# Patient Record
Sex: FEMALE | Race: WHITE | Employment: FULL TIME | URBAN - METROPOLITAN AREA
[De-identification: names, ages, dates, MRNs, and addresses within clinical notes are randomized per-mention and may not be internally consistent; named-entity substitution may affect disease eponyms.]

---

## 2017-03-16 ENCOUNTER — OFFICE VISIT (OUTPATIENT)
Dept: ENDOCRINOLOGY | Age: 32
End: 2017-03-16

## 2017-03-16 VITALS
BODY MASS INDEX: 28.74 KG/M2 | DIASTOLIC BLOOD PRESSURE: 81 MMHG | SYSTOLIC BLOOD PRESSURE: 131 MMHG | RESPIRATION RATE: 16 BRPM | WEIGHT: 194.6 LBS | OXYGEN SATURATION: 98 % | HEART RATE: 107 BPM

## 2017-03-16 DIAGNOSIS — R53.82 CHRONIC FATIGUE: ICD-10-CM

## 2017-03-16 DIAGNOSIS — E28.2 PCOS (POLYCYSTIC OVARIAN SYNDROME): Primary | ICD-10-CM

## 2017-03-16 DIAGNOSIS — E06.3 HASHIMOTO'S DISEASE: ICD-10-CM

## 2017-03-16 DIAGNOSIS — R79.89 LOW SERUM CORTISOL LEVEL: ICD-10-CM

## 2017-03-16 DIAGNOSIS — Z15.89 HOMOZYGOUS MTHFR MUTATION C677T: ICD-10-CM

## 2017-03-16 PROCEDURE — 99214 OFFICE O/P EST MOD 30 MIN: CPT | Performed by: INTERNAL MEDICINE

## 2017-03-16 RX ORDER — COSYNTROPIN 0.25 MG/ML
0.25 INJECTION, POWDER, FOR SOLUTION INTRAMUSCULAR; INTRAVENOUS ONCE
Qty: 250 MCG | Refills: 0 | Status: SHIPPED | OUTPATIENT
Start: 2017-03-16 | End: 2017-03-16

## 2017-03-16 RX ORDER — GUAIFENESIN/EPHEDRINE HCL 200-12.5MG
TABLET ORAL
COMMUNITY
End: 2018-05-31

## 2017-03-27 ENCOUNTER — OFFICE VISIT (OUTPATIENT)
Dept: FAMILY MEDICINE CLINIC | Age: 32
End: 2017-03-27

## 2017-03-27 VITALS
SYSTOLIC BLOOD PRESSURE: 100 MMHG | BODY MASS INDEX: 28.2 KG/M2 | DIASTOLIC BLOOD PRESSURE: 70 MMHG | HEIGHT: 70 IN | HEART RATE: 77 BPM | OXYGEN SATURATION: 97 % | WEIGHT: 197 LBS

## 2017-03-27 DIAGNOSIS — E03.9 ACQUIRED HYPOTHYROIDISM: ICD-10-CM

## 2017-03-27 DIAGNOSIS — R79.89 LOW SERUM CORTISOL LEVEL: ICD-10-CM

## 2017-03-27 DIAGNOSIS — F34.1 DYSTHYMIA: Primary | ICD-10-CM

## 2017-03-27 DIAGNOSIS — E28.2 PCOS (POLYCYSTIC OVARIAN SYNDROME): ICD-10-CM

## 2017-03-27 PROCEDURE — 99214 OFFICE O/P EST MOD 30 MIN: CPT | Performed by: FAMILY MEDICINE

## 2017-03-28 ENCOUNTER — PATIENT MESSAGE (OUTPATIENT)
Dept: ENDOCRINOLOGY | Age: 32
End: 2017-03-28

## 2017-03-28 ENCOUNTER — HOSPITAL ENCOUNTER (OUTPATIENT)
Dept: ONCOLOGY | Age: 32
Discharge: HOME | End: 2017-03-31
Attending: INTERNAL MEDICINE | Admitting: INTERNAL MEDICINE

## 2017-03-28 VITALS
RESPIRATION RATE: 18 BRPM | SYSTOLIC BLOOD PRESSURE: 111 MMHG | TEMPERATURE: 98 F | HEART RATE: 67 BPM | DIASTOLIC BLOOD PRESSURE: 76 MMHG

## 2017-03-28 LAB
CORTISOL 30 MIN: 16.9 UG/DL
CORTISOL 60 MIN: 20.9 UG/DL
CORTISOL BASE: 5.8 UG/DL

## 2017-03-28 RX ORDER — COSYNTROPIN 0.25 MG/ML
0.25 INJECTION, POWDER, FOR SOLUTION INTRAMUSCULAR; INTRAVENOUS ONCE
Status: COMPLETED | OUTPATIENT
Start: 2017-03-28 | End: 2017-03-28

## 2017-03-28 RX ADMIN — COSYNTROPIN 0.25 MG: 0.25 INJECTION, POWDER, FOR SOLUTION INTRAMUSCULAR; INTRAVENOUS at 09:49

## 2017-04-02 DIAGNOSIS — R79.89 LOW SERUM CORTISOL LEVEL: Primary | ICD-10-CM

## 2017-04-02 RX ORDER — HYDROCORTISONE 5 MG/1
5 TABLET ORAL 3 TIMES DAILY
Qty: 90 TABLET | Refills: 2 | Status: SHIPPED | OUTPATIENT
Start: 2017-04-02 | End: 2017-04-26 | Stop reason: SDUPTHER

## 2017-04-11 ENCOUNTER — TELEPHONE (OUTPATIENT)
Dept: ENDOCRINOLOGY | Age: 32
End: 2017-04-11

## 2017-04-18 RX ORDER — SYRINGE W-NEEDLE,DISPOSAB,3 ML 25GX5/8"
1 SYRINGE, EMPTY DISPOSABLE MISCELLANEOUS DAILY PRN
Qty: 25 EACH | Refills: 0 | Status: SHIPPED | OUTPATIENT
Start: 2017-04-18 | End: 2018-05-31

## 2017-04-19 ENCOUNTER — TELEPHONE (OUTPATIENT)
Dept: ENDOCRINOLOGY | Age: 32
End: 2017-04-19

## 2017-04-24 ENCOUNTER — TELEPHONE (OUTPATIENT)
Dept: ENDOCRINOLOGY | Age: 32
End: 2017-04-24

## 2017-04-26 RX ORDER — HYDROCORTISONE 10 MG/1
10 TABLET ORAL 3 TIMES DAILY
Qty: 90 TABLET | Refills: 2 | Status: SHIPPED | OUTPATIENT
Start: 2017-04-26 | End: 2018-01-09

## 2017-08-01 RX ORDER — HYDROCORTISONE 5 MG/1
TABLET ORAL
Qty: 90 TABLET | Refills: 0 | Status: SHIPPED | OUTPATIENT
Start: 2017-08-01 | End: 2018-01-09

## 2017-09-28 ENCOUNTER — OFFICE VISIT (OUTPATIENT)
Dept: FAMILY MEDICINE CLINIC | Age: 32
End: 2017-09-28

## 2017-09-28 VITALS
SYSTOLIC BLOOD PRESSURE: 124 MMHG | TEMPERATURE: 98.6 F | BODY MASS INDEX: 29.99 KG/M2 | DIASTOLIC BLOOD PRESSURE: 86 MMHG | WEIGHT: 209 LBS | OXYGEN SATURATION: 99 % | HEART RATE: 98 BPM

## 2017-09-28 DIAGNOSIS — R79.89 LOW SERUM CORTISOL LEVEL: ICD-10-CM

## 2017-09-28 DIAGNOSIS — F41.9 ANXIETY: Primary | ICD-10-CM

## 2017-09-28 DIAGNOSIS — E06.3 HASHIMOTO'S DISEASE: ICD-10-CM

## 2017-09-28 PROCEDURE — 99214 OFFICE O/P EST MOD 30 MIN: CPT | Performed by: FAMILY MEDICINE

## 2017-09-28 RX ORDER — ESCITALOPRAM OXALATE 10 MG/1
TABLET ORAL
Qty: 30 TABLET | Refills: 5 | Status: SHIPPED | OUTPATIENT
Start: 2017-09-28 | End: 2018-05-31

## 2017-12-27 ENCOUNTER — TELEPHONE (OUTPATIENT)
Dept: FAMILY MEDICINE CLINIC | Age: 32
End: 2017-12-27

## 2017-12-28 ENCOUNTER — OFFICE VISIT (OUTPATIENT)
Dept: FAMILY MEDICINE CLINIC | Age: 32
End: 2017-12-28

## 2017-12-28 VITALS
HEIGHT: 70 IN | BODY MASS INDEX: 29.63 KG/M2 | SYSTOLIC BLOOD PRESSURE: 142 MMHG | WEIGHT: 207 LBS | DIASTOLIC BLOOD PRESSURE: 97 MMHG | HEART RATE: 92 BPM

## 2017-12-28 DIAGNOSIS — R03.0 ELEVATED BP WITHOUT DIAGNOSIS OF HYPERTENSION: ICD-10-CM

## 2017-12-28 DIAGNOSIS — R00.2 PALPITATIONS: ICD-10-CM

## 2017-12-28 DIAGNOSIS — R07.9 CHEST PAIN, UNSPECIFIED TYPE: Primary | ICD-10-CM

## 2017-12-28 DIAGNOSIS — R53.82 CHRONIC FATIGUE: ICD-10-CM

## 2017-12-28 PROCEDURE — 99214 OFFICE O/P EST MOD 30 MIN: CPT | Performed by: FAMILY MEDICINE

## 2017-12-28 PROCEDURE — 93000 ELECTROCARDIOGRAM COMPLETE: CPT | Performed by: FAMILY MEDICINE

## 2017-12-28 RX ORDER — LEVOTHYROXINE AND LIOTHYRONINE 19; 4.5 UG/1; UG/1
30 TABLET ORAL DAILY
COMMUNITY
End: 2018-05-31

## 2017-12-28 ASSESSMENT — PATIENT HEALTH QUESTIONNAIRE - PHQ9
2. FEELING DOWN, DEPRESSED OR HOPELESS: 1
1. LITTLE INTEREST OR PLEASURE IN DOING THINGS: 0
SUM OF ALL RESPONSES TO PHQ9 QUESTIONS 1 & 2: 1
SUM OF ALL RESPONSES TO PHQ QUESTIONS 1-9: 1

## 2017-12-28 NOTE — PROGRESS NOTES
Subjective:      Patient ID: Silva Dunn is a 34 y.o. female. HPI  Chief Complaint   Patient presents with    Chest Pain     started in november, constant x 4 wks, elevated BP x 3 wks    Had episodes of and now has constant sternal pain, initially worse after eating at times, could awaken pt from sleep. Pt had been eating 2 meals per day but has now changed to 6 small meals per day and sx's no lonager seem related to eating. Denies sob, n/v, diaphoresis during cp. Activity and movement do not exacerbate sternal pain. Occ feels palpitations, not necessarily related to cp. Denies syncope. Saw endocrinologist at [facility] early Dec 2017, was dx'd with POTS. Endocrine rx'd florinef but had migraines. Was told to decrease  Florinef and HA improved. Was told to wear b/l knee high support socks and to drink electrolyte water. Pt was referred to GI, cardiology and neurologist. Has appt with [facility] cardiology in Mar, unsure when other appts will be scheduled. Pt emailed [facility] endo few days ago b/c bp has been high. Pt was told to d/c florinef and see dr for ekg and echo. Last florinef 12/25/17. Does not feel better since stopping rx. Was off hydrocortisone x 5 wks and then repeated ACTH challenge at [facility]. Test reportedly now c/w Addisons but perhaps mildly abnl. Pt cont's to feel fatigued. LE pain cont'd. Pt had 2016 LE EMG, was nl. Pain is better since wearing support socks. Ears and hands feel hot, hands are sweating. Pt stopped vit D and all supplements x 2 mo. Vit D was 62 in 3/17 but was on vit D 1000 iu qd at that time. Thyr labs nl at [facility], and pt con'ts on armour thyroid. Has lump L prox medial groin noted 1 day ago, not painful but pt is concerned it may need I&D as 1 did in the past.     Review of Systems   Constitutional: Positive for fatigue. Negative for activity change, appetite change, fever and unexpected weight change. HENT: Negative. Eyes: Negative.

## 2017-12-30 LAB
A/G RATIO: 1.8 (CALC) (ref 1–2.5)
ALBUMIN SERPL-MCNC: 4.4 G/DL (ref 3.6–5.1)
ALP BLD-CCNC: 73 U/L (ref 33–115)
ALT SERPL-CCNC: 19 U/L (ref 6–29)
AST SERPL-CCNC: 18 U/L (ref 10–30)
BASOPHILS ABSOLUTE: 29 CELLS/UL (ref 0–200)
BASOPHILS RELATIVE PERCENT: 0.4 %
BILIRUB SERPL-MCNC: 0.4 MG/DL (ref 0.2–1.2)
BUN / CREAT RATIO: NORMAL (CALC) (ref 6–22)
BUN BLDV-MCNC: 15 MG/DL (ref 7–25)
CALCIUM SERPL-MCNC: 9.4 MG/DL (ref 8.6–10.2)
CHLORIDE BLD-SCNC: 105 MMOL/L (ref 98–110)
CO2: 23 MMOL/L (ref 20–31)
CREAT SERPL-MCNC: 0.74 MG/DL (ref 0.5–1.1)
EOSINOPHILS ABSOLUTE: 94 CELLS/UL (ref 15–500)
EOSINOPHILS RELATIVE PERCENT: 1.3 %
GFR AFRICAN AMERICAN: 123 ML/MIN/1.73M2
GFR SERPL CREATININE-BSD FRML MDRD: 106 ML/MIN/1.73M2
GLOBULIN: 2.5 G/DL (CALC) (ref 1.9–3.7)
GLUCOSE BLD-MCNC: 93 MG/DL (ref 65–99)
HCT VFR BLD CALC: 36.2 % (ref 35–45)
HEMOGLOBIN: 12.1 G/DL (ref 11.7–15.5)
LYMPHOCYTES ABSOLUTE: 1894 CELLS/UL (ref 850–3900)
LYMPHOCYTES RELATIVE PERCENT: 26.3 %
MCH RBC QN AUTO: 27.2 PG (ref 27–33)
MCHC RBC AUTO-ENTMCNC: 33.4 G/DL (ref 32–36)
MCV RBC AUTO: 81.3 FL (ref 80–100)
MONOCYTES ABSOLUTE: 583 CELLS/UL (ref 200–950)
MONOCYTES RELATIVE PERCENT: 8.1 %
NEUTROPHILS ABSOLUTE: 4601 CELLS/UL (ref 1500–7800)
PDW BLD-RTO: 13 % (ref 11–15)
PLATELET # BLD: 380 THOUSAND/UL (ref 140–400)
PMV BLD AUTO: 10.7 FL (ref 7.5–12.5)
POTASSIUM SERPL-SCNC: 4.5 MMOL/L (ref 3.5–5.3)
RBC # BLD: 4.45 MILLION/UL (ref 3.8–5.1)
SEGMENTED NEUTROPHILS RELATIVE PERCENT: 63.9 %
SODIUM BLD-SCNC: 136 MMOL/L (ref 135–146)
TOTAL PROTEIN: 6.9 G/DL (ref 6.1–8.1)
VITAMIN B-12: 484 PG/ML (ref 200–1100)
VITAMIN D 25-HYDROXY: 41 NG/ML (ref 30–100)
WBC # BLD: 7.2 THOUSAND/UL (ref 3.8–10.8)

## 2017-12-30 ASSESSMENT — ENCOUNTER SYMPTOMS
VOMITING: 0
DIARRHEA: 0
WHEEZING: 0
NAUSEA: 0
APNEA: 0
SHORTNESS OF BREATH: 0
EYES NEGATIVE: 1
ABDOMINAL PAIN: 0
CONSTIPATION: 0
COUGH: 0
CHEST TIGHTNESS: 0

## 2018-01-02 ENCOUNTER — TELEPHONE (OUTPATIENT)
Dept: FAMILY MEDICINE CLINIC | Age: 33
End: 2018-01-02

## 2018-01-02 ENCOUNTER — HOSPITAL ENCOUNTER (OUTPATIENT)
Dept: NON INVASIVE DIAGNOSTICS | Age: 33
Discharge: HOME | End: 2018-01-02
Attending: FAMILY MEDICINE | Admitting: FAMILY MEDICINE

## 2018-01-02 DIAGNOSIS — R07.9 CHEST PAIN: ICD-10-CM

## 2018-01-02 NOTE — TELEPHONE ENCOUNTER
----- Message from Rommel Ulloa MD sent at 12/30/2017 10:39 PM EST -----  Good news. Vit D and vit B12 are nl, so she does not need to supplement these. Bs, kid, grace labs nl. WBC and RBC cts are also nl.  Thx.

## 2018-01-07 LAB
ACQUISITION DURATION: NORMAL S
AVERAGE HEART RATE: 86 BPM
EKG DIAGNOSIS: NORMAL
FASTEST SUPRAVENTRICULAR RATE: 202 BPM
HOLTER MAX HEART RATE: 159 BPM
HOOKUP DATE: NORMAL
HOOKUP TIME: NORMAL
LONGEST SUPRAVENTRICULAR RATE: 202 BPM
Lab: NORMAL
MAX HEART RATE TIME/DATE: NORMAL
MIN HEART RATE TIME/DATE: NORMAL
MIN HEART RATE: 56 BPM
NUMBER OF FASTEST SUPRAVENTRICULAR BEATS: 3
NUMBER OF LONGEST SUPRAVENTRICULAR BEATS: 3
NUMBER OF QRS COMPLEXES: NORMAL
NUMBER OF SUPRAVENTRICULAR BEATS IN RUNS: 3
NUMBER OF SUPRAVENTRICULAR COUPLETS: 1
NUMBER OF SUPRAVENTRICULAR ECTOPICS: 17
NUMBER OF SUPRAVENTRICULAR ISOLATED BEATS: 12
NUMBER OF SUPRAVENTRICULAR RUNS: 1
NUMBER OF VENTRICULAR BEATS IN RUNS: 0
NUMBER OF VENTRICULAR BIGEMINAL CYCLES: 0
NUMBER OF VENTRICULAR COUPLETS: 0
NUMBER OF VENTRICULAR ECTOPICS: 1
NUMBER OF VENTRICULAR ISOLATED BEATS: 1
NUMBER OF VENTRICULAR RUNS: 0

## 2018-01-08 ENCOUNTER — OFFICE VISIT (OUTPATIENT)
Dept: FAMILY MEDICINE CLINIC | Age: 33
End: 2018-01-08

## 2018-01-08 VITALS
DIASTOLIC BLOOD PRESSURE: 82 MMHG | SYSTOLIC BLOOD PRESSURE: 120 MMHG | HEART RATE: 74 BPM | HEIGHT: 70 IN | BODY MASS INDEX: 29.35 KG/M2 | WEIGHT: 205 LBS

## 2018-01-08 DIAGNOSIS — R03.0 ELEVATED BP WITHOUT DIAGNOSIS OF HYPERTENSION: ICD-10-CM

## 2018-01-08 DIAGNOSIS — R00.2 PALPITATIONS: Primary | ICD-10-CM

## 2018-01-08 PROCEDURE — 99213 OFFICE O/P EST LOW 20 MIN: CPT | Performed by: FAMILY MEDICINE

## 2018-01-09 ASSESSMENT — ENCOUNTER SYMPTOMS
SHORTNESS OF BREATH: 0
COUGH: 0
WHEEZING: 0

## 2018-01-28 ENCOUNTER — PATIENT MESSAGE (OUTPATIENT)
Dept: FAMILY MEDICINE CLINIC | Age: 33
End: 2018-01-28

## 2018-01-29 NOTE — TELEPHONE ENCOUNTER
From: Kriss Redd  To: Fan Garsia MD  Sent: 1/28/2018 6:14 PM EST  Subject: Non-Urgent Medical Question    I would like to see what you think about some pain i've been having that is constant now & my be related to the gallbladder. i have this pain that's like a string from the lower front rib cage through my chest to my back & it feels like a giant knot in the middle of my body. it hurts to shrug my shoulder & the pain will be in my sternum, right rib, under my shoulder blade and then like a nerve from my shoulder blade up to my neck. it doesn't get better with tylenol, bath or massage. i've been experiencing this constantly for about 2 weeks but it's starting to wake me out of my sleep multiple times a night. i was telling my mom & she thinks it may be a gallbladder. we have history of gallbladder and kidney stones on both sides of my family.

## 2018-03-07 ENCOUNTER — PATIENT MESSAGE (OUTPATIENT)
Dept: FAMILY MEDICINE CLINIC | Age: 33
End: 2018-03-07

## 2018-03-08 DIAGNOSIS — H93.19 TINNITUS, UNSPECIFIED LATERALITY: Primary | ICD-10-CM

## 2018-05-31 ENCOUNTER — OFFICE VISIT (OUTPATIENT)
Dept: FAMILY MEDICINE CLINIC | Age: 33
End: 2018-05-31

## 2018-05-31 VITALS
WEIGHT: 204 LBS | HEART RATE: 55 BPM | DIASTOLIC BLOOD PRESSURE: 70 MMHG | OXYGEN SATURATION: 99 % | SYSTOLIC BLOOD PRESSURE: 110 MMHG | BODY MASS INDEX: 29.27 KG/M2

## 2018-05-31 DIAGNOSIS — N64.4 MASTODYNIA: Primary | ICD-10-CM

## 2018-05-31 DIAGNOSIS — R07.89 OTHER CHEST PAIN: ICD-10-CM

## 2018-05-31 DIAGNOSIS — G90.A POTS (POSTURAL ORTHOSTATIC TACHYCARDIA SYNDROME): ICD-10-CM

## 2018-05-31 PROCEDURE — 99214 OFFICE O/P EST MOD 30 MIN: CPT | Performed by: FAMILY MEDICINE

## 2018-05-31 RX ORDER — PROPRANOLOL HYDROCHLORIDE 80 MG/1
80 TABLET ORAL
COMMUNITY

## 2018-07-04 ENCOUNTER — PATIENT MESSAGE (OUTPATIENT)
Dept: FAMILY MEDICINE CLINIC | Age: 33
End: 2018-07-04

## 2018-07-04 DIAGNOSIS — R53.81 MALAISE: Primary | ICD-10-CM

## 2018-07-05 NOTE — TELEPHONE ENCOUNTER
From: Traci Ashraf  To: Nery Medina MD  Sent: 7/4/2018 1:00 PM EDT  Subject: Non-Urgent Medical Question    I'm trying 2 figure out the root cause of all my issues. i'm dealing with some severe chest pain that my cardiologist just says that's a symptom of POTS & there's nothing i can do about it. i still have that mid chest pain on my right side we talked about & the breast pain has dissipated. i'm also nervous about testing & the toxicity that i can get from them since i can't detox well with the MTHFR, so i haven't done those tests yet. lyme disease has always been in the back of my head but lately i've been more concerned. I doing thyroid blood work done soon & was wondering if you could do the lyme blood test? can you do a lab draw request form w/o an appointment? i understand it's about 50% accurate but if i get a positive test then great!

## 2018-07-13 LAB — Lab: <0.9 INDEX

## 2020-11-11 ENCOUNTER — OFFICE VISIT (OUTPATIENT)
Dept: PRIMARY CARE CLINIC | Age: 35
End: 2020-11-11

## 2020-11-11 PROCEDURE — 99211 OFF/OP EST MAY X REQ PHY/QHP: CPT | Performed by: NURSE PRACTITIONER

## 2020-11-11 NOTE — PATIENT INSTRUCTIONS

## 2020-11-11 NOTE — PROGRESS NOTES
Olga Chilango received a viral test for COVID-19. They were educated on isolation and quarantine as appropriate. For any symptoms, they were directed to seek care from their PCP, given contact information to establish with a doctor, directed to an urgent care or the emergency room.

## 2020-11-13 LAB — SARS-COV-2, NAA: NOT DETECTED
